# Patient Record
(demographics unavailable — no encounter records)

---

## 2024-10-10 NOTE — PHYSICAL EXAM
[Chaperone Present] : A chaperone was present in the examining room during all aspects of the physical examination [16466] : A chaperone was present during the pelvic exam. [Appropriately responsive] : appropriately responsive [Alert] : alert [No Acute Distress] : no acute distress [Labia Majora] : normal [Labia Minora] : normal [Normal] : normal [Uterine Adnexae] : normal [FreeTextEntry2] : Tia Clark

## 2024-10-10 NOTE — HISTORY OF PRESENT ILLNESS
[FreeTextEntry1] : 10/10/2024 LAUREN SORTO 85-year-old female presents for pessary change.  Patient is satisfied w/ pessary. She reports adequate support w/ pessary in place. No abnormal vaginal bleeding, vaginal discharge, or vaginitis sxs. Denies discomfort.

## 2024-10-10 NOTE — PLAN
[FreeTextEntry1] : 85-year-old female presents for pessary change: Pessary was removed, cleaned, and reinserted F/u in 4 months for pessary clean, or PRN.

## 2024-10-10 NOTE — SIGNATURES
[TextEntry] : This note was authored by Tia Clark working as a scribe for Dr. Tor Amato.   I, Dr. Tor Amato, have reviewed the content of this note and confirm it is true and accurate. I personally performed the history and physical examination and made all the decisions. 10/10/2024

## 2024-10-17 NOTE — HISTORY OF PRESENT ILLNESS
[de-identified] : Presents for BP check, labs, and general follow-up; also requests the current flu vaccine.

## 2024-10-17 NOTE — ASSESSMENT
[FreeTextEntry1] : Hemodynamically stable with acceptable BP Lab profiles drawn in office and sent High dose flu vaccine given L deltoid

## 2025-03-05 NOTE — HISTORY OF PRESENT ILLNESS
[Hearing Loss] : hearing loss [Ear Fullness] : ear fullness [de-identified] : Ms. SORTO is a 85 year-old female who presents with cerumen impaction.  She reports that she is under the care of her audiologist, Michael Coyle.  She was seen 6 months ago and has no complaints.

## 2025-03-05 NOTE — PHYSICAL EXAM
[Midline] : trachea located in midline position [Normal] : no rashes [de-identified] : +bilateral cerumen impaction - removed.

## 2025-03-05 NOTE — ASSESSMENT
[FreeTextEntry1] : -  Cerumen removed without any difficulties -  Follow up with Audiologist, Rey Coyle.  -  Follow up in a year or PRN

## 2025-03-05 NOTE — CONSULT LETTER
[Dear  ___] : Dear  [unfilled], [Courtesy Letter:] : I had the pleasure of seeing your patient, [unfilled], in my office today. [Please see my note below.] : Please see my note below. [Sincerely,] : Sincerely, [DrOtf  ___] : Dr. FONSECA [FreeTextEntry2] : Gus Chamberlain MD ([facility]) [FreeTextEntry3] : Adam Michel, [place], PAAlbinoC Sr. Physician Assistant, Otolaryngology at [facility] Adjunct Clinical Instructor, Department of Physician Assistant Studies, [address] [address]

## 2025-03-05 NOTE — PROCEDURE
[Risk and Benefits Discussed] : The purpose, risks, discomforts, benefits and alternatives of the procedure have been explained to the patient including no treatment. [Other ___] : [unfilled] [Same] : same as the Pre Op Dx. [] : Removal of Cerumen [FreeTextEntry6] : After informed verbal consent is obtained, cerumen is removed from both ear canal . The cerumen was removed using alligator forceps.

## 2025-03-05 NOTE — REVIEW OF SYSTEMS
[As Noted in HPI] : as noted in HPI [Hearing Loss] : hearing loss [Negative] : Heme/Lymph [Dizziness] : no dizziness [Lightheadedness] : no lightheadedness

## 2025-07-07 NOTE — HISTORY OF PRESENT ILLNESS
[FreeTextEntry1] : 2025. LAUREN SORTO 86 year old female  presents for an annual gyn exam.   Patient complains of frequent urination. No abnormal vaginal bleeding, pain, or vaginal discharge. No abdominal or pelvic pain. Denies changes in medical status, medications, serious illness, hospitalizations, and surgeries. Reviewed last mammogram and breast sonogram from - [facility] Reviewed patient's current medications. Followed yearly by PCP for screening laboratories.   GynHx cystocele PMHx IBS SurgHx hernia, laminectomy, kidney, T&A OBHx  x3 Allergies Clindamycin Meds Amlodipine

## 2025-07-07 NOTE — PHYSICAL EXAM
[Chaperoned Physical Exam] : A chaperone was present in the examining room during all aspects of the physical examination. [FreeTextEntry2] : marcelino weir [FreeTextEntry1] : varsha [Appropriately responsive] : appropriately responsive [Alert] : alert [No Acute Distress] : no acute distress [No Lymphadenopathy] : no lymphadenopathy [Regular Rate Rhythm] : regular rate rhythm [No Murmurs] : no murmurs [Clear to Auscultation B/L] : clear to auscultation bilaterally [Soft] : soft [Non-tender] : non-tender [Non-distended] : non-distended [No HSM] : No HSM [No Lesions] : no lesions [No Mass] : no mass [Oriented x3] : oriented x3 [Examination Of The Breasts] : a normal appearance [No Masses] : no breast masses were palpable [Vulvar Atrophy] : vulvar atrophy [Labia Majora] : normal [Labia Minora] : normal [Atrophy] : atrophy [Cystocele] : a cystocele [Uterine Prolapse] : uterine prolapse [Normal] : normal [Uterine Adnexae] : normal [FreeTextEntry9] : Guaiac test negative, no masses noted

## 2025-07-07 NOTE — PLAN
[FreeTextEntry1] : Health Maintenance: Normal gyn examination. Pap smear conducted today. Rx given for mammogram and breast sonogram Advised pt to see PCP annually for screening laboratories. Nutrition and exercise discussed.  Pessary Maintenance: Pessary ring removed, cleaned and reinserted  Elevated Calcium Score on 4/25 Mammo:  Advised to f/u with her cardiologist   RTO PRN or in 3 months for pessary change  Anne TOVAR am scribing for and in the presence of KATARINA Gonzales M.D. in the following sections: HISTORY OF PRESENT ILLNESS, PAST MEDICAL/FAMILY/SOCIAL HISTORY, REVIEW OF SYSTEMS, PHYSICAL EXAM, ASSESSMENT/PLAN.

## 2025-07-07 NOTE — REASON FOR VISIT
[FreeTextEntry1] : Osteoporosis. She has no history of fragility fracture. She has a remote history of alendronate and ibandronate use, zoledronic acid in 2013, and most recently denosumab since September 2016 with two skipped doses. Her last bone density, while not directly comparable to previous, showed an improvement in T-score at the lumbar spine. We discussed the potential benefits and risks of antiresorptive osteoporosis therapy at length, including but not limited to osteonecrosis of the jaw and atypical femoral fracture. She is tolerating denosumab and we will continue. We discussed that denosumab must be dosed every 6 months due to rebound increase in bone breakdown with abrupt discontinuation of therapy, with transition to bisphosphonate therapy prior to a "drug holiday."\par Continue denosumab 60 mg SC every 6 months; next dose due\par Calcium 1200 mg daily from diet and supplements (to be taken in divided doses as no more than 500-600 mg can be absorbed at one time); advised supplemental calcium\par Continue current vitamin D regimen pending level\par Diet, exercise and fall prevention discussed\par She declines physical therapy for balance and bone health\par \par Hyperthyroidism. Thyroid nodules. She has presumed toxic multinodular goiter, although I-123 thyroid uptake and scan with diffuse uptake. TSH receptor antibody and thyroid stimulating immunoglobulin are negative. She has been clinically and biochemically euthyroid on a low dose of methimazole. We discussed that approximately 95 percent of all thyroid nodules are caused by benign conditions. We discussed that thyroid nodules are very common and less likely to be cancer in older individuals. We discussed the risks and benefits of evaluation for thyroid cancer and morbidity of treatment if diagnosed versus overall prognosis of thyroid cancer. We will defer further ultrasound surveillance. \par Continue methimazole 2.5 mg daily pending thyroid function tests\par \par Return to see me in 6 months or earlier as needed. \par \par I reviewed the DXA performed on November 15, 2019 with the patient today.\par I reviewed the thyroid ultrasound performed on May 3, 2021 with the patient today. \par I counseled the patient regarding calcium and vitamin D intake today.\par I discussed the following osteoporosis therapies: Denosumab\par \par CC:\par Dr. Debbi Montgomery, Fax [phone]
[Annual] : an annual visit.